# Patient Record
Sex: FEMALE | Race: WHITE | NOT HISPANIC OR LATINO | URBAN - METROPOLITAN AREA
[De-identification: names, ages, dates, MRNs, and addresses within clinical notes are randomized per-mention and may not be internally consistent; named-entity substitution may affect disease eponyms.]

---

## 2017-10-18 ENCOUNTER — EMERGENCY (EMERGENCY)
Facility: HOSPITAL | Age: 27
LOS: 1 days | End: 2017-10-18
Payer: COMMERCIAL

## 2017-10-18 PROCEDURE — 99284 EMERGENCY DEPT VISIT MOD MDM: CPT

## 2017-10-29 ENCOUNTER — EMERGENCY (EMERGENCY)
Facility: HOSPITAL | Age: 27
LOS: 1 days | Discharge: HOME | End: 2017-10-29
Admitting: EMERGENCY MEDICINE
Payer: COMMERCIAL

## 2017-10-29 VITALS
SYSTOLIC BLOOD PRESSURE: 126 MMHG | HEART RATE: 80 BPM | OXYGEN SATURATION: 99 % | TEMPERATURE: 98 F | RESPIRATION RATE: 16 BRPM | DIASTOLIC BLOOD PRESSURE: 85 MMHG

## 2017-10-29 PROCEDURE — 99283 EMERGENCY DEPT VISIT LOW MDM: CPT

## 2017-10-29 PROCEDURE — 99285 EMERGENCY DEPT VISIT HI MDM: CPT | Mod: 25

## 2017-10-29 NOTE — ED ADULT TRIAGE NOTE - CHIEF COMPLAINT QUOTE
headache x7 weeks; as per pt has a cyst on brain; nauseous; 1 episode of vomiting today, no blood in vomit

## 2017-10-30 VITALS
RESPIRATION RATE: 18 BRPM | DIASTOLIC BLOOD PRESSURE: 62 MMHG | TEMPERATURE: 98 F | OXYGEN SATURATION: 100 % | HEART RATE: 80 BPM | SYSTOLIC BLOOD PRESSURE: 100 MMHG

## 2017-10-30 LAB
ALBUMIN SERPL ELPH-MCNC: 4.4 G/DL — SIGNIFICANT CHANGE UP (ref 3.3–5)
ALP SERPL-CCNC: 57 U/L — SIGNIFICANT CHANGE UP (ref 40–120)
ALT FLD-CCNC: 13 U/L RC — SIGNIFICANT CHANGE UP (ref 10–45)
ANION GAP SERPL CALC-SCNC: 13 MMOL/L — SIGNIFICANT CHANGE UP (ref 5–17)
AST SERPL-CCNC: 14 U/L — SIGNIFICANT CHANGE UP (ref 10–40)
BASOPHILS # BLD AUTO: 0.1 K/UL — SIGNIFICANT CHANGE UP (ref 0–0.2)
BASOPHILS NFR BLD AUTO: 0.7 % — SIGNIFICANT CHANGE UP (ref 0–2)
BILIRUB SERPL-MCNC: 0.3 MG/DL — SIGNIFICANT CHANGE UP (ref 0.2–1.2)
BUN SERPL-MCNC: 12 MG/DL — SIGNIFICANT CHANGE UP (ref 7–23)
CALCIUM SERPL-MCNC: 9.4 MG/DL — SIGNIFICANT CHANGE UP (ref 8.4–10.5)
CHLORIDE SERPL-SCNC: 102 MMOL/L — SIGNIFICANT CHANGE UP (ref 96–108)
CO2 SERPL-SCNC: 24 MMOL/L — SIGNIFICANT CHANGE UP (ref 22–31)
CREAT SERPL-MCNC: 0.78 MG/DL — SIGNIFICANT CHANGE UP (ref 0.5–1.3)
EOSINOPHIL # BLD AUTO: 0.2 K/UL — SIGNIFICANT CHANGE UP (ref 0–0.5)
EOSINOPHIL NFR BLD AUTO: 1.9 % — SIGNIFICANT CHANGE UP (ref 0–6)
GLUCOSE SERPL-MCNC: 95 MG/DL — SIGNIFICANT CHANGE UP (ref 70–99)
HCG SERPL-ACNC: <2 MIU/ML — SIGNIFICANT CHANGE UP
HCT VFR BLD CALC: 42.8 % — SIGNIFICANT CHANGE UP (ref 34.5–45)
HGB BLD-MCNC: 15.1 G/DL — SIGNIFICANT CHANGE UP (ref 11.5–15.5)
LYMPHOCYTES # BLD AUTO: 2.6 K/UL — SIGNIFICANT CHANGE UP (ref 1–3.3)
LYMPHOCYTES # BLD AUTO: 31.8 % — SIGNIFICANT CHANGE UP (ref 13–44)
MCHC RBC-ENTMCNC: 33.7 PG — SIGNIFICANT CHANGE UP (ref 27–34)
MCHC RBC-ENTMCNC: 35.2 GM/DL — SIGNIFICANT CHANGE UP (ref 32–36)
MCV RBC AUTO: 95.8 FL — SIGNIFICANT CHANGE UP (ref 80–100)
MONOCYTES # BLD AUTO: 0.6 K/UL — SIGNIFICANT CHANGE UP (ref 0–0.9)
MONOCYTES NFR BLD AUTO: 8 % — SIGNIFICANT CHANGE UP (ref 2–14)
NEUTROPHILS # BLD AUTO: 4.7 K/UL — SIGNIFICANT CHANGE UP (ref 1.8–7.4)
NEUTROPHILS NFR BLD AUTO: 57.6 % — SIGNIFICANT CHANGE UP (ref 43–77)
PLATELET # BLD AUTO: 194 K/UL — SIGNIFICANT CHANGE UP (ref 150–400)
POTASSIUM SERPL-MCNC: 3.8 MMOL/L — SIGNIFICANT CHANGE UP (ref 3.5–5.3)
POTASSIUM SERPL-SCNC: 3.8 MMOL/L — SIGNIFICANT CHANGE UP (ref 3.5–5.3)
PROT SERPL-MCNC: 7.3 G/DL — SIGNIFICANT CHANGE UP (ref 6–8.3)
RBC # BLD: 4.47 M/UL — SIGNIFICANT CHANGE UP (ref 3.8–5.2)
RBC # FLD: 11.6 % — SIGNIFICANT CHANGE UP (ref 10.3–14.5)
SODIUM SERPL-SCNC: 139 MMOL/L — SIGNIFICANT CHANGE UP (ref 135–145)
WBC # BLD: 8.2 K/UL — SIGNIFICANT CHANGE UP (ref 3.8–10.5)
WBC # FLD AUTO: 8.2 K/UL — SIGNIFICANT CHANGE UP (ref 3.8–10.5)

## 2017-10-30 PROCEDURE — 70450 CT HEAD/BRAIN W/O DYE: CPT | Mod: 26

## 2017-10-30 PROCEDURE — 85027 COMPLETE CBC AUTOMATED: CPT

## 2017-10-30 PROCEDURE — 70450 CT HEAD/BRAIN W/O DYE: CPT

## 2017-10-30 PROCEDURE — 80053 COMPREHEN METABOLIC PANEL: CPT

## 2017-10-30 PROCEDURE — 70553 MRI BRAIN STEM W/O & W/DYE: CPT

## 2017-10-30 PROCEDURE — 70553 MRI BRAIN STEM W/O & W/DYE: CPT | Mod: 26

## 2017-10-30 PROCEDURE — 96374 THER/PROPH/DIAG INJ IV PUSH: CPT | Mod: XU

## 2017-10-30 PROCEDURE — 96375 TX/PRO/DX INJ NEW DRUG ADDON: CPT

## 2017-10-30 PROCEDURE — 99284 EMERGENCY DEPT VISIT MOD MDM: CPT | Mod: 25

## 2017-10-30 PROCEDURE — A9585: CPT

## 2017-10-30 PROCEDURE — 72156 MRI NECK SPINE W/O & W/DYE: CPT

## 2017-10-30 PROCEDURE — 84702 CHORIONIC GONADOTROPIN TEST: CPT

## 2017-10-30 PROCEDURE — 72156 MRI NECK SPINE W/O & W/DYE: CPT | Mod: 26

## 2017-10-30 RX ORDER — METOCLOPRAMIDE HCL 10 MG
10 TABLET ORAL ONCE
Qty: 0 | Refills: 0 | Status: COMPLETED | OUTPATIENT
Start: 2017-10-30 | End: 2017-10-30

## 2017-10-30 RX ORDER — MORPHINE SULFATE 50 MG/1
4 CAPSULE, EXTENDED RELEASE ORAL ONCE
Qty: 0 | Refills: 0 | Status: DISCONTINUED | OUTPATIENT
Start: 2017-10-30 | End: 2017-10-30

## 2017-10-30 RX ORDER — ACETAMINOPHEN 500 MG
1000 TABLET ORAL ONCE
Qty: 0 | Refills: 0 | Status: COMPLETED | OUTPATIENT
Start: 2017-10-30 | End: 2017-10-30

## 2017-10-30 RX ORDER — METOCLOPRAMIDE HCL 10 MG
1 TABLET ORAL
Qty: 30 | Refills: 0 | OUTPATIENT
Start: 2017-10-30

## 2017-10-30 RX ORDER — METOCLOPRAMIDE HCL 10 MG
1 TABLET ORAL
Qty: 20 | Refills: 0 | OUTPATIENT
Start: 2017-10-30

## 2017-10-30 RX ADMIN — MORPHINE SULFATE 4 MILLIGRAM(S): 50 CAPSULE, EXTENDED RELEASE ORAL at 02:03

## 2017-10-30 RX ADMIN — Medication 125 MILLIGRAM(S): at 11:36

## 2017-10-30 RX ADMIN — MORPHINE SULFATE 4 MILLIGRAM(S): 50 CAPSULE, EXTENDED RELEASE ORAL at 02:33

## 2017-10-30 RX ADMIN — Medication 10 MILLIGRAM(S): at 02:03

## 2017-10-30 RX ADMIN — Medication 400 MILLIGRAM(S): at 12:22

## 2017-10-30 NOTE — CONSULT NOTE ADULT - SUBJECTIVE AND OBJECTIVE BOX
Neurology consult    YORDAN [id number]     Patient is a 27y old  Female who presents with a chief complaint of     HPI: 27 F no significant PMH c/o 6 weeks right frontal HA associated with phonophobia and photophobia, with an MRI  weeks prior demonstrating a chiari malformation as well as a L frontal arachnoid cyst initially presented to the Ed for complaints of 1 week of fatigue and generalized weakness . She was prescribed fioricet and Topomax with limited relief. States she had episodes associated with HA of B/L forearm numbness since resolved. Patient states that when she underwent confrontational testing in the ED she realized she has right hand weakness. Denies present numbness, diplopia aphasia, visual changes, dysarthria. States presently HA is controlled.    REVIEW OF SYSTEMS:    Constitutional: No fever, chills, +fatigue, + generalized weakness  Eyes: no eye pain, visual disturbances, or discharge  ENT:  No difficulty hearing, tinnitus, vertigo; No sinus or throat pain  Neck: No pain or stiffness  Respiratory: No cough, dyspnea, wheezing   Cardiovascular: No chest pain, palpitations,   Gastrointestinal: No abdominal or epigastric pain. No nausea, vomiting  No diarrhea or constipation.   Genitourinary: No dysuria, frequency, hematuria or incontinence  Neurological: see HPI  Psychiatric: No depression, anxiety, mood swings or difficulty sleeping  Musculoskeletal: No joint pain or swelling; No muscle, back or extremity pain  Skin: No itching, burning, rashes or lesions   Lymph Nodes: No enlarged glands  Endocrine: No heat or cold intolerance; No hair loss, No h/o diabetes or thyroid dysfunction  Allergy and Immunologic: No hives or eczema    MEDICATIONS    Topamax  Fioricet      PMH: No pertinent past medical history       PSH: No significant past surgical history        FAMILY HISTORY:  No pertinent family history in first degree relatives      SOCIAL HISTORY:  No history of tobacco or alcohol use     Allergies    No Known Allergies    Intolerances            Vital Signs Last 24 Hrs  T(C): 36.9 (29 Oct 2017 23:58), Max: 36.9 (29 Oct 2017 23:58)  T(F): 98.4 (29 Oct 2017 23:58), Max: 98.4 (29 Oct 2017 23:58)  HR: 80 (29 Oct 2017 23:58) (80 - 80)  BP: 126/85 (29 Oct 2017 23:58) (126/85 - 126/85)  BP(mean): --  RR: 16 (29 Oct 2017 23:58) (16 - 16)  SpO2: 99% (29 Oct 2017 23:58) (99% - 99%)      On Neurological Examination:    Mental Status - Patient is alert, awake, oriented X3. fluent, names, no dysarthria no aphasia Follows commands well and able to answer questions appropriately. Mood and affect  normal    Cranial Nerves - PERRL, EOMI, VFF, V1-V3 intact, no gross facial asymmetry, tongue/uvula midline    Motor Exam - no drift in all extremeties  Right upper 5/5 other than 4-/5 right hand   Left upper 5/5  Right lower 5/5  Left lower 5/5     nml bulk/tone    Sensory    Intact to light touch and pinprick bilaterally    Coord: FTN intact bilaterally     Gait -  normal      Reflexes:          2+ throughout  , plantars down                                       GENERAL Exam:     Nontoxic , No Acute Distress   	  HEENT:  normocephalic, atraumatic  		  LUNGS:	Clear bilaterally  No Wheeze  Decreased bilaterally  	  HEART:	 Normal S1S2   No murmur RRR        	  GI/ ABDOMEN:  Soft  Non tender    EXTREMITIES:   No Edema  No Clubbing  No Cyanosis No Edema    MUSCULOSKELETAL: Normal Range of Motion  	   SKIN:      Normal   No Ecchymosis               LABS:  CBC Full  -  ( 30 Oct 2017 01:11 )  WBC Count : 8.2 K/uL  Hemoglobin : 15.1 g/dL  Hematocrit : 42.8 %  Platelet Count - Automated : 194 K/uL  Mean Cell Volume : 95.8 fl  Mean Cell Hemoglobin : 33.7 pg  Mean Cell Hemoglobin Concentration : 35.2 gm/dL  Auto Neutrophil # : 4.7 K/uL  Auto Lymphocyte # : 2.6 K/uL  Auto Monocyte # : 0.6 K/uL  Auto Eosinophil # : 0.2 K/uL  Auto Basophil # : 0.1 K/uL  Auto Neutrophil % : 57.6 %  Auto Lymphocyte % : 31.8 %  Auto Monocyte % : 8.0 %  Auto Eosinophil % : 1.9 %  Auto Basophil % : 0.7 %      10-30    139  |  102  |  12  ----------------------------<  95  3.8   |  24  |  0.78    Ca    9.4      30 Oct 2017 01:11    TPro  7.3  /  Alb  4.4  /  TBili  0.3  /  DBili  x   /  AST  14  /  ALT  13  /  AlkPhos  57  10-30    LIVER FUNCTIONS - ( 30 Oct 2017 01:11 )  Alb: 4.4 g/dL / Pro: 7.3 g/dL / ALK PHOS: 57 U/L / ALT: 13 U/L RC / AST: 14 U/L / GGT: x           Hemoglobin A1C:             RADIOLOGY  [facility]   MRI:

## 2017-10-30 NOTE — ED PROVIDER NOTE - ATTENDING CONTRIBUTION TO CARE
MD Garcia:  patient seen and evaluated with the resident.  I was present for key portions of the History & Physical, and I agree with the Impression & Plan.  MD Garcia:  27 F, c/o intractable HA.  Duration:  acute 1wk, chronic 6wk.  Context:  recent MRI 2wks ago demonstrated possible chiari malformation with brain cyst.  Patient was told to f/u in [facility], but has yet to obtain an appointment.  VS - wnl.  Physical Exam: adult F, PERRL, NCAT, EOMI, neck supple, CTA B, Abd: s/nd/nt.  Neuro AAOx3, strength 4/5 RUE biceps, triceps, and  strength.  lower extremities normal.  Impression:  severe HA, with new RUE weakness.  Plan:  nsgy consult, possible neuro consult, reassess, CT head.

## 2017-10-30 NOTE — CONSULT NOTE ADULT - ATTENDING COMMENTS
Agree with the above assessment and plan. Patient has significant Chiari, but presentation seems most c/w complex migraine and would recommend neurology for complete headache evaluation and management. Would additionally recommend:    - MRI T spine since patient is at high risk for having a syrinx  - will continue to follow

## 2017-10-30 NOTE — ED ADULT NURSE NOTE - OBJECTIVE STATEMENT
27 y.o female aaox3, ambulatory with no known medical history and was recently diagnosed with Chiari malformation. Patient p/w c/o headache, dizziness and nausea for few weeks now not relieved by prescribed meds, no chills or fever, no sob, chest pain, abdominal pain. VS wnl, denies any numbness or weakness at the moment.

## 2017-10-30 NOTE — ED PROVIDER NOTE - PROGRESS NOTE DETAILS
Outpatient MRI images reviewed with NSGY and Radiology; neither old MRI nor CT head reveal potential cause of her HA/RUE weakness.  Plan:  Neuro consult, repeat MRI brain and c-spine AMANDA PGY1 spoke to Dr. Chiang from radiology. She states that she spoke to neurosurgery who think that the patient does not need an emergency MRI right now and that she can have it done tomorrow morning since the patient's symptoms have been going on for weeks.

## 2017-10-30 NOTE — ED PROVIDER NOTE - NEUROLOGICAL, MLM
CN2-12 grossly intact. Normal speech and tone. Normal gait. Patient has noticeable RUE weakness. Alert and oriented, no focal deficits, no motor or sensory deficits.

## 2017-10-30 NOTE — ED PROVIDER NOTE - MEDICAL DECISION MAKING DETAILS
27F recently dx'd with brain cyst and chiari malformation p/w severe headache. Patient has focal findings in RUE. Will perform head CT non con to look for bleed. Neurosurgery informed and going to bedside to evaluate patient for ?surgical intervention. Analgesia, reassess possible admit

## 2017-10-30 NOTE — ED PROVIDER NOTE - OBJECTIVE STATEMENT
27F recently dx'd with cyst in brain and arnold chiari malformation via outpatient MRI p/w headache. Patient states that she has had constant persistent headaches for 6 weeks with intermittent b/l weakness, numbness tingling. Had one episode 2 weeks ago when she was driving and said she felt her arms "shut down." Has had intermittent vomiting and had one episode of n/v today. Reports no vision changes 27F recently dx'd with cyst in brain and arnold chiari malformation via outpatient MRI p/w headache. Patient states that she has had constant persistent headaches for 6 weeks with intermittent b/l weakness, numbness tingling. Had one episode 2 weeks ago when she was driving and said she felt her arms "shut down." Has had intermittent vomiting and had one episode of n/v today. Reports no vision changes, no gait abnormalities, dysphagia, dysphasia, cp, sob, fevers, chills, photophobia.

## 2017-10-30 NOTE — CONSULT NOTE ADULT - SUBJECTIVE AND OBJECTIVE BOX
Pager: [phone]  CHIEF COMPLAINT/ REASON FOR CONSULTATION:      HPI:  27F no pertinent PMH, presented to the ED c/o intractable headache. She had an MRI 2 weeks ago which showed a chiari malformation as well as a L frontal arachnoid cyst. She says that for the past 6 weeks the headache has been constant. She was seen in the ED previously and prescribed fioricet with some relief, however today the headache was unmanageable, and so she came to the emergency department. She also states that she has had a couple episodes of numbness in her arms, both times it spontaneously resolved, however gradually over the past two weeks, since her previous MRI, she has noticed some R hand weakness. No other changes in strength/sensation. No blurry vision. Headaches significantly improved while in the ED.       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    FAMILY HISTORY:  No pertinent family history in first degree relatives      REVIEW OF SYSTEMS:  Check here if all are normal other than Neurological [X]  General:  Eyes:  ENT:  Cardiac:  Respiratory:  GI:  Musculoskeletal:   Skin:  Neurologic:   Psychiatric:     PHYSICAL EXAMINATION:   T(C): 36.9 (10-29-17 @ 23:58), Max: 36.9 (10-29-17 @ 23:58)  HR: 80 (10-29-17 @ 23:58) (80 - 80)  BP: 126/85 (10-29-17 @ 23:58) (126/85 - 126/85)  RR: 16 (10-29-17 @ 23:58) (16 - 16)  SpO2: 99% (10-29-17 @ 23:58) (99% - 99%)  Wt(kg): --    Exam:  Awake, Alert, AOX3  PERRL, EOMI, Face equal, Tongue m/l  5/5 throughout except R HG 4/5, R bicepts/tricepts 4+/5, no drift  SILT      LABS:                        15.1   8.2   )-----------( 194      ( 30 Oct 2017 01:11 )             42.8     10-30    139  |  102  |  12  ----------------------------<  95  3.8   |  24  |  0.78    Ca    9.4      30 Oct 2017 01:11    TPro  7.3  /  Alb  4.4  /  TBili  0.3  /  DBili  x   /  AST  14  /  ALT  13  /  AlkPhos  57  10-30          RADIOLOGY & ADDITIONAL STUDIES:  [facility]: L frontal arachnoid cyst, R choroid cyst

## 2017-10-30 NOTE — ED PROVIDER NOTE - NS ED ROS FT
CONST: no fevers, no chills  EYES: no pain  ENT: no sore throat   CV: no chest pain  RESP: no shortness of breath  ABD: no abdominal pain   : no dysuria  MSK: no back pain  NEURO: +uncontrollable headache. constant. refractory to butalbital, topiramate, ibuprofen.  HEME: no easy bleeding  SKIN:  no rash

## 2017-10-30 NOTE — ED ADULT NURSE NOTE - CHPI ED SYMPTOMS NEG
no loss of consciousness/no numbness/no fever/no vomiting/no weakness/no blurred vision/no confusion/no change in level of consciousness

## 2017-10-30 NOTE — ED PROVIDER NOTE - MUSCULOSKELETAL, MLM
4/5 RUE strength. 5/5 LUE strength. Spine appears normal, range of motion is not limited, no muscle or joint tenderness

## 2017-10-30 NOTE — ED ADULT NURSE REASSESSMENT NOTE - NS ED NURSE REASSESS COMMENT FT1
patient remains stable, VS wnl, headache subsided pending MRI and Neuro consult.
Received report this AM. Pt went for MRI and has returned. PT observed resting comfortably in bed. Pt denies any needs at this time. Plan of care reviewed with pt. Safety and comfort maintained.

## 2017-12-27 PROBLEM — Z00.00 ENCOUNTER FOR PREVENTIVE HEALTH EXAMINATION: Status: ACTIVE | Noted: 2017-12-27

## 2017-12-28 ENCOUNTER — APPOINTMENT (OUTPATIENT)
Dept: NEUROLOGY | Facility: CLINIC | Age: 27
End: 2017-12-28
Payer: COMMERCIAL

## 2017-12-28 VITALS
WEIGHT: 140 LBS | HEIGHT: 64 IN | BODY MASS INDEX: 23.9 KG/M2 | DIASTOLIC BLOOD PRESSURE: 70 MMHG | HEART RATE: 72 BPM | OXYGEN SATURATION: 98 % | SYSTOLIC BLOOD PRESSURE: 113 MMHG

## 2017-12-28 DIAGNOSIS — G43.009 MIGRAINE W/OUT AURA, NOT INTRACTABLE, W/OUT STATUS MIGRAINOSUS: ICD-10-CM

## 2017-12-28 PROCEDURE — 99244 OFF/OP CNSLTJ NEW/EST MOD 40: CPT

## 2017-12-28 RX ORDER — SUMATRIPTAN 100 MG/1
100 TABLET, FILM COATED ORAL
Qty: 9 | Refills: 5 | Status: ACTIVE | COMMUNITY
Start: 2017-12-28 | End: 1900-01-01

## 2017-12-28 RX ORDER — UBIDECARENONE 200 MG
200 CAPSULE ORAL
Qty: 90 | Refills: 0 | Status: ACTIVE | COMMUNITY
Start: 2017-12-28 | End: 1900-01-01

## 2017-12-28 RX ORDER — NORETHINDRONE ACETATE/ETHINYL ESTRADIOL AND FERROUS FUMARATE 1MG-20(21)
KIT ORAL
Refills: 0 | Status: ACTIVE | COMMUNITY

## 2018-04-02 ENCOUNTER — APPOINTMENT (OUTPATIENT)
Dept: NEUROLOGY | Facility: CLINIC | Age: 28
End: 2018-04-02

## 2018-12-19 ENCOUNTER — TRANSCRIPTION ENCOUNTER (OUTPATIENT)
Age: 28
End: 2018-12-19

## 2021-08-17 NOTE — CONSULT NOTE ADULT - ASSESSMENT
27 F no significant PMH c/o 6 weeks right frontal HA associated with phonophobia and photophobia, with an MRI  weeks prior demonstrating a chiari malformation as well as a L frontal arachnoid cyst initially presented to the Ed for complaints of 1 week of fatigue and generalized weakness, now c/o of right  weakness after she noticed it on ED's confrontational exam. PE right  weakness. ED getting MRI to r/o central process, syringomyelia etc. (although exam not very consistent with syrinx)    -Will f/U MRI C-spine, Brain  -PT  -ensure neurology F/U as an outpatient [phone]
27F with newly diagnosed chiari malformation, now with RUE weakness.     - No acute neurosurgical intervention, the MRI from 2 weeks ago does not explain the RUE weakness  - MRI brain and C spine to rule out compressive lesion  - Neurology eval   - Pain control
full weight-bearing

## 2021-11-18 ENCOUNTER — TRANSCRIPTION ENCOUNTER (OUTPATIENT)
Age: 31
End: 2021-11-18

## 2022-02-23 NOTE — ED ADULT NURSE NOTE - NEURO WDL
Patient Specific Counseling (Will Not Stick From Patient To Patient): She is progressing considerably with just topical clobetasol use. Will move forward with light box therapy and get baseline labs in preparation for acitretin initiation.
Detail Level: Generalized
Alert and oriented to person, place and time, memory intact, behavior appropriate to situation, PERRL.